# Patient Record
(demographics unavailable — no encounter records)

---

## 2025-01-22 NOTE — ASSESSMENT
[FreeTextEntry1] : Reviewed on January 22, 2025. PMD notes reviewed. EKG from today and EKG from outside office reviewed. Labs from recently done showed sodium 140 potassium 4.3 creatinine 0.8 LFT normal.  HDL 92 triglycerides 99 lipoprotein a 880 hemoglobin A1c is 6.2.

## 2025-01-22 NOTE — REASON FOR VISIT
[Arrhythmia/ECG Abnorrmalities] : arrhythmia/ECG abnormalities [Hyperlipidemia] : hyperlipidemia [Hypertension] : hypertension [FreeTextEntry3] : Dr. Juan [FreeTextEntry1] : 74-year-old is seen in cardiac consultation in presence of Dyslipidemia which includes significantly elevated lipoprotein a Extensive history of cigarette smoking Essential hypertension on intermittent medication use Prediabetes Dyslipidemia on low-dose of statin Cerebral aneurysm was being followed at [facility] Osteoporosis Chronic back pain Anxiety. Limited functional status.  Exertional dyspnea.  No chest pain.  No PND orthopnea.  No palpitation dizziness near syncopal or syncopal event. Stable weight. Unable to quit smoking understands options available.

## 2025-01-22 NOTE — REVIEW OF SYSTEMS
[Anxiety] : anxiety [Negative] : Heme/Lymph [FreeTextEntry5] : As per HPI [FreeTextEntry6] : as per hpi [FreeTextEntry9] : as per hpi

## 2025-01-22 NOTE — PHYSICAL EXAM
[Frail] : frail [Normal Venous Pressure] : normal venous pressure [Normal S1, S2] : normal S1, S2 [No Rub] : no rub [Murmur] : murmur [Clear Lung Fields] : clear lung fields [Soft] : abdomen soft [No Edema] : no edema [Normal Radial B/L] : normal radial B/L [Normal DP B/L] : normal DP B/L [Normal Speech] : normal speech [Alert and Oriented] : alert and oriented [de-identified] : decreased carotid auptroke

## 2025-01-22 NOTE — DISCUSSION/SUMMARY
[FreeTextEntry1] : 75-year-old with all above medical history and active medical problems as noted below 1.  Exertional dyspnea with extensive history of smoking, hypertension, dyslipidemia,  nonspecific abnormal EKG. Recommended echocardiogram at present.  Does not want to pursue any exercise stress test is unable to do it because of back pain and does not want to have nuclear myocardial perfusion scan.  Based on echocardiogram we can discuss further whether to consider coronary CTA or not. 2.  Because of extensive history of smoking age hypertension dyslipidemia hyperglycemia recommended also carotid Doppler study and abdominal aortic ultrasound to assess evaluate ASCVD associated vascular abnormality to minimize the risk of future events. 3.  Essential hypertension.  Elevated today on multiple occasions.  Losartan 25 mg started with plans alternatives side effects reviewed. 4.  Dyslipidemia.  Continue statin therapy.  Lipoprotein a elevation noted.  No other medications besides LDL available for treatment of lipoprotein a.  She understands there is a study protocol going on the results not available for medication to treat lipoprotein a specifically.  Continue lifestyle modifications. 5.  Cerebral aneurysm and other medical conditions to be managed with respective specialist. 6..diet I have reviewed above at length. I answered all the questions. Patient verbalized understandings. Thank you very much for allowing me to participate in your patient's care. Please feel free to call me for any questions. Sincerely,  Bhargavi Aguiar MD, FACC, AYESHA  [EKG obtained to assist in diagnosis and management of assessed problem(s)] : EKG obtained to assist in diagnosis and management of assessed problem(s)

## 2025-02-25 NOTE — PHYSICAL EXAM
[Frail] : frail [Normal Venous Pressure] : normal venous pressure [Normal S1, S2] : normal S1, S2 [No Rub] : no rub [Murmur] : murmur [Clear Lung Fields] : clear lung fields [Soft] : abdomen soft [No Edema] : no edema [Normal Radial B/L] : normal radial B/L [Normal DP B/L] : normal DP B/L [Normal Speech] : normal speech [Alert and Oriented] : alert and oriented [de-identified] : decreased carotid auptroke

## 2025-02-25 NOTE — ADDENDUM
[FreeTextEntry1] : Please note the patient was reviewed with PA, Jazmine Griggs. I was physically present during the service of the patient. I was directly involved in the management plan and recommendations of the care provided to the patient. I personally reviewed the history and physical examination as documented by the PA above.

## 2025-02-25 NOTE — PHYSICAL EXAM
[Frail] : frail [Normal Venous Pressure] : normal venous pressure [Normal S1, S2] : normal S1, S2 [No Rub] : no rub [Murmur] : murmur [Clear Lung Fields] : clear lung fields [Soft] : abdomen soft [No Edema] : no edema [Normal Radial B/L] : normal radial B/L [Normal DP B/L] : normal DP B/L [Normal Speech] : normal speech [Alert and Oriented] : alert and oriented [de-identified] : decreased carotid auptroke

## 2025-02-25 NOTE — REASON FOR VISIT
[Arrhythmia/ECG Abnorrmalities] : arrhythmia/ECG abnormalities [Hyperlipidemia] : hyperlipidemia [Hypertension] : hypertension [FreeTextEntry3] : Dr. Juan [FreeTextEntry1] : CRISTO GLEASON  is a 75 year F  who presents today Feb 25, 2025 for echo, carotid, abd US and clinical follow up. Since last seen she has been feeling well from a cardiovascular perspective. There has been no recent illness or hospital stay. Medications have remained unchanged. Asymptomatic from cardiovascular and arrhythmia standpoint.  Today she denies chest pain, pressure, unusual shortness of breath, lightheadedness, dizziness, near syncope or syncope.  Dyslipidemia which includes significantly elevated lipoprotein a Extensive history of cigarette smoking Essential hypertension on intermittent medication use Prediabetes Dyslipidemia on low-dose of statin Cerebral aneurysm was being followed at [facility] Osteoporosis Chronic back pain Anxiety. Limited functional status.  Exertional dyspnea.  No chest pain.  No PND orthopnea.  No palpitation dizziness near syncopal or syncopal event. Stable weight. Unable to quit smoking understands options available.

## 2025-02-25 NOTE — CARDIOLOGY SUMMARY
[de-identified] : January 22, 2025.  Normal sinus rhythm poor R wave progression [de-identified] : Echo 2/25/2025 EF 55%, trace MR [de-identified] : Abd US 2/25/2025

## 2025-02-25 NOTE — CARDIOLOGY SUMMARY
[de-identified] : January 22, 2025.  Normal sinus rhythm poor R wave progression [de-identified] : Echo 2/25/2025 EF 55%, trace MR [de-identified] : Abd US 2/25/2025

## 2025-02-25 NOTE — DISCUSSION/SUMMARY
[FreeTextEntry1] : CRISTO GLEASON  is a 75 year F  who presents today Feb 25, 2025 with the above history and the following active issues.   Exertional dyspnea with extensive history of smoking, hypertension, dyslipidemia,  nonspecific abnormal EKG, family history of SCD at a young age, low functional status.  Atherosclerosis noted on cardiovascular testing. Recommend further ischemic evaluation with CTA coronary arteries.  I have requested she call to review the results over the phone.  Essential hypertension Tolerating Losartan 25mg QD Blood pressure goal <130/80 Low sodium diet  Dyslipidemia Recommend increasing Crestor to 10mg QD Follow-up labs in 8 weeks LDL goal <70 Lifestyle and risk factor modification  Red flag symptoms which would warrant sooner emergent evaluation reviewed with the patient.  Questions and concerns were addressed and answered.  Limitations of non-invasive testing reviewed  Sincerely,  Jazmine Griggs PA-C Patients history, testing and plan reviewed with supervising MD: Dr. Bhargavi Aguiar

## 2025-02-25 NOTE — REASON FOR VISIT
[Arrhythmia/ECG Abnorrmalities] : arrhythmia/ECG abnormalities [Hyperlipidemia] : hyperlipidemia [Hypertension] : hypertension [FreeTextEntry3] : Dr. Juan [FreeTextEntry1] : CRISTO GLEASON  is a 75 year F  who presents today Feb 25, 2025 for echo, carotid, abd US and clinical follow up. Since last seen she has been feeling well from a cardiovascular perspective. There has been no recent illness or hospital stay. Medications have remained unchanged. Asymptomatic from cardiovascular and arrhythmia standpoint.  Today she denies chest pain, pressure, unusual shortness of breath, lightheadedness, dizziness, near syncope or syncope.  Dyslipidemia which includes significantly elevated lipoprotein a Extensive history of cigarette smoking Essential hypertension on intermittent medication use Prediabetes Dyslipidemia on low-dose of statin Cerebral aneurysm was being followed at [facility] Osteoporosis Chronic back pain Anxiety. Limited functional status.  Exertional dyspnea.  No chest pain.  No PND orthopnea.  No palpitation dizziness near syncopal or syncopal event. Stable weight. Unable to quit smoking understands options available.